# Patient Record
Sex: MALE | Race: WHITE | NOT HISPANIC OR LATINO | URBAN - METROPOLITAN AREA
[De-identification: names, ages, dates, MRNs, and addresses within clinical notes are randomized per-mention and may not be internally consistent; named-entity substitution may affect disease eponyms.]

---

## 2022-01-01 ENCOUNTER — LAB OUTSIDE AN ENCOUNTER (OUTPATIENT)
Dept: URBAN - METROPOLITAN AREA CLINIC 113 | Facility: CLINIC | Age: 47
End: 2022-01-01

## 2022-01-01 ENCOUNTER — CLAIMS CREATED FROM THE CLAIM WINDOW (OUTPATIENT)
Dept: URBAN - METROPOLITAN AREA MEDICAL CENTER 19 | Facility: MEDICAL CENTER | Age: 47
End: 2022-01-01
Payer: COMMERCIAL

## 2022-01-01 ENCOUNTER — WEB ENCOUNTER (OUTPATIENT)
Dept: URBAN - METROPOLITAN AREA CLINIC 113 | Facility: CLINIC | Age: 47
End: 2022-01-01

## 2022-01-01 ENCOUNTER — OFFICE VISIT (OUTPATIENT)
Dept: URBAN - METROPOLITAN AREA MEDICAL CENTER 2 | Facility: MEDICAL CENTER | Age: 47
End: 2022-01-01
Payer: COMMERCIAL

## 2022-01-01 ENCOUNTER — TELEPHONE ENCOUNTER (OUTPATIENT)
Dept: URBAN - METROPOLITAN AREA CLINIC 113 | Facility: CLINIC | Age: 47
End: 2022-01-01

## 2022-01-01 ENCOUNTER — DASHBOARD ENCOUNTERS (OUTPATIENT)
Age: 47
End: 2022-01-01

## 2022-01-01 ENCOUNTER — OFFICE VISIT (OUTPATIENT)
Dept: URBAN - METROPOLITAN AREA CLINIC 113 | Facility: CLINIC | Age: 47
End: 2022-01-01
Payer: COMMERCIAL

## 2022-01-01 ENCOUNTER — CLAIMS CREATED FROM THE CLAIM WINDOW (OUTPATIENT)
Dept: URBAN - METROPOLITAN AREA MEDICAL CENTER 43 | Facility: MEDICAL CENTER | Age: 47
End: 2022-01-01
Payer: COMMERCIAL

## 2022-01-01 VITALS
HEIGHT: 72 IN | BODY MASS INDEX: 23.98 KG/M2 | DIASTOLIC BLOOD PRESSURE: 79 MMHG | TEMPERATURE: 97.8 F | SYSTOLIC BLOOD PRESSURE: 128 MMHG | HEART RATE: 80 BPM | WEIGHT: 177 LBS

## 2022-01-01 DIAGNOSIS — R10.816 EPIGASTRIC TENDERNESS: ICD-10-CM

## 2022-01-01 DIAGNOSIS — R10.9 UNSPECIFIED ABDOMINAL PAIN: ICD-10-CM

## 2022-01-01 DIAGNOSIS — C25.9 MALIGNANT NEOPLASM OF PANCREAS, UNSPECIFIED: ICD-10-CM

## 2022-01-01 DIAGNOSIS — K83.1 AMPULLA OF VATER OBSTRUCTION SYNDROME: ICD-10-CM

## 2022-01-01 DIAGNOSIS — R74.8 ABNORMAL LEVELS OF OTHER SERUM ENZYMES: ICD-10-CM

## 2022-01-01 DIAGNOSIS — R93.5 ABNORMAL CT OF THE ABDOMEN: ICD-10-CM

## 2022-01-01 DIAGNOSIS — K31.89 ACQUIRED DEFORMITY OF PYLORUS: ICD-10-CM

## 2022-01-01 DIAGNOSIS — K83.1 OBSTRUCTION OF BILE DUCT: ICD-10-CM

## 2022-01-01 DIAGNOSIS — R14.0 ABDOMINAL BLOATING , IMPROVED BUT NOT RESOLVED WITH XIFAXAN: ICD-10-CM

## 2022-01-01 DIAGNOSIS — D49.0 COLON NEOPLASM: ICD-10-CM

## 2022-01-01 DIAGNOSIS — C25.0 CA HEAD OF PANCREAS: ICD-10-CM

## 2022-01-01 DIAGNOSIS — K85.00 IDIOPATHIC ACUTE PANCREATITIS WITHOUT NECROSIS OR INFECTION: ICD-10-CM

## 2022-01-01 DIAGNOSIS — R11.2 ACUTE NAUSEA WITH NONBILIOUS VOMITING: ICD-10-CM

## 2022-01-01 DIAGNOSIS — C25.9 ADENOCARCINOMA OF PANCREAS: ICD-10-CM

## 2022-01-01 DIAGNOSIS — R93.2 ABN FIND-BILIARY TRACT: ICD-10-CM

## 2022-01-01 DIAGNOSIS — K86.89 MASS OF PANCREAS: ICD-10-CM

## 2022-01-01 DIAGNOSIS — E46 PROTEIN CALORIE MALNUTRITION: ICD-10-CM

## 2022-01-01 DIAGNOSIS — K83.1 BILIARY STRICTURE: ICD-10-CM

## 2022-01-01 DIAGNOSIS — C78.7 CANCER, METASTATIC TO LIVER: ICD-10-CM

## 2022-01-01 DIAGNOSIS — K85.80 AUTOIMMUNE PANCREATITIS: ICD-10-CM

## 2022-01-01 DIAGNOSIS — K83.8 OTHER SPECIFIED DISEASES OF BILIARY TRACT: ICD-10-CM

## 2022-01-01 DIAGNOSIS — B37.81 CANDIDA: ICD-10-CM

## 2022-01-01 DIAGNOSIS — K83.1 BILE OBSTRUCTION: ICD-10-CM

## 2022-01-01 DIAGNOSIS — R93.3 ABNORMAL ABDOMINAL ULTRASOUND SUGGESTING HEMANGIOMA: ICD-10-CM

## 2022-01-01 DIAGNOSIS — R10.817 ABDOMINAL TENDERNESS, GENERALIZED: ICD-10-CM

## 2022-01-01 DIAGNOSIS — R74.8 ELEVATED LIVER ENZYMES: ICD-10-CM

## 2022-01-01 DIAGNOSIS — R74.8 ABNORMAL AST AND ALT: ICD-10-CM

## 2022-01-01 DIAGNOSIS — C25.0 ADENOCARCINOMA OF HEAD OF PANCREAS: ICD-10-CM

## 2022-01-01 DIAGNOSIS — K22.70 BARRETT ESOPHAGUS: ICD-10-CM

## 2022-01-01 DIAGNOSIS — R14.0 ABDOMINAL BLOATING: ICD-10-CM

## 2022-01-01 DIAGNOSIS — R74.01 ALT (SGPT) LEVEL RAISED: ICD-10-CM

## 2022-01-01 DIAGNOSIS — K86.2 PANCREAS CYST: ICD-10-CM

## 2022-01-01 DIAGNOSIS — K86.1 CHRONIC PANCREATITIS: ICD-10-CM

## 2022-01-01 DIAGNOSIS — R63.4 ABNORMAL INTENTIONAL WEIGHT LOSS: ICD-10-CM

## 2022-01-01 DIAGNOSIS — R17 JAUNDICE: ICD-10-CM

## 2022-01-01 DIAGNOSIS — Z79.01 ANTICOAGULANT LONG-TERM USE: ICD-10-CM

## 2022-01-01 LAB — CA 19-9: 841

## 2022-01-01 PROCEDURE — 99232 SBSQ HOSP IP/OBS MODERATE 35: CPT | Performed by: INTERNAL MEDICINE

## 2022-01-01 PROCEDURE — 99254 IP/OBS CNSLTJ NEW/EST MOD 60: CPT | Performed by: INTERNAL MEDICINE

## 2022-01-01 PROCEDURE — 99233 SBSQ HOSP IP/OBS HIGH 50: CPT | Performed by: INTERNAL MEDICINE

## 2022-01-01 PROCEDURE — 43260 ERCP W/SPECIMEN COLLECTION: CPT | Performed by: INTERNAL MEDICINE

## 2022-01-01 PROCEDURE — 99214 OFFICE O/P EST MOD 30 MIN: CPT | Performed by: NURSE PRACTITIONER

## 2022-01-01 PROCEDURE — 99254 IP/OBS CNSLTJ NEW/EST MOD 60: CPT | Performed by: PHYSICIAN ASSISTANT

## 2022-01-01 PROCEDURE — 99222 1ST HOSP IP/OBS MODERATE 55: CPT | Performed by: INTERNAL MEDICINE

## 2022-01-01 PROCEDURE — 43239 EGD BIOPSY SINGLE/MULTIPLE: CPT | Performed by: INTERNAL MEDICINE

## 2022-01-01 PROCEDURE — 43276 ERCP STENT EXCHANGE W/DILATE: CPT | Performed by: INTERNAL MEDICINE

## 2022-01-01 PROCEDURE — 43262 ENDO CHOLANGIOPANCREATOGRAPH: CPT | Performed by: INTERNAL MEDICINE

## 2022-01-01 PROCEDURE — 43242 EGD US FINE NEEDLE BX/ASPIR: CPT | Performed by: INTERNAL MEDICINE

## 2022-01-01 PROCEDURE — 43274 ERCP DUCT STENT PLACEMENT: CPT | Performed by: INTERNAL MEDICINE

## 2022-01-01 PROCEDURE — 74328 X-RAY BILE DUCT ENDOSCOPY: CPT | Performed by: INTERNAL MEDICINE

## 2022-01-01 RX ORDER — AMOXICILLIN 250 MG
1 TABLET IN THE EVENING AS NEEDED CAPSULE ORAL ONCE A DAY
Status: ACTIVE | COMMUNITY

## 2022-01-01 RX ORDER — PANTOPRAZOLE SODIUM 40 MG/1
1 TABLET TABLET, DELAYED RELEASE ORAL ONCE A DAY
Status: ACTIVE | COMMUNITY

## 2022-01-01 RX ORDER — DICYCLOMINE HYDROCHLORIDE 20 MG/1
1 TABLET TABLET ORAL THREE TIMES A DAY
Status: ACTIVE | COMMUNITY

## 2022-01-01 RX ORDER — FLUCONAZOLE 100 MG/1
1 TABLET TABLET ORAL ONCE DAILY
Qty: 7 | Refills: 0 | OUTPATIENT
Start: 2022-01-01 | End: 2022-01-01

## 2022-01-01 RX ORDER — HYDROMORPHONE HYDROCHLORIDE 4 MG/1
1 TABLET AS NEEDED TABLET ORAL
Status: ACTIVE | COMMUNITY

## 2022-01-01 RX ORDER — DIPHENHYDRAMINE HYDROCHLORIDE AND ZINC ACETATE .01; .001 MG/G; U/G
1 CAPSULE WITH FOOD OR MILK AS NEEDED CREAM TOPICAL THREE TIMES A DAY
Status: ACTIVE | COMMUNITY

## 2022-03-17 PROBLEM — 31258000: Status: ACTIVE | Noted: 2022-01-01

## 2022-03-17 NOTE — HPI-TODAY'S VISIT:
This is a 46-year-old male with a history of pancreatitis, elevated LFTs/jaundice, common bile duct stricture, pancreatic mass, and elevated CA 19-9 presenting for hospital follow-up. He was initially seen in hospital consultation 2/14/2022 for acute pancreatitis.  Etiology was unclear.  Liver enzymes were elevated.  MRCP was negative for choledocholithiasis.  Also noted was a 1.2 cm cystic lesion in uncinate process of the pancreas.  Labs to assess for autoimmune hepatitis were pending at discharge.  He was seen again in hospital consultation 3/3/2022 for acute pancreatitis and concern for pancreatic duct outlet obstruction.  IgG4, ASMA, AMA, and alpha-1 antitrypsin at prior admission were negative.  Imaging demonstrated dilated gallbladder and intrahepatic bile ducts without choledocholithiasis.  CT redemonstrated cystic lesion of the uncinate process of the pancreas and some pancreatic head stranding.  CA 19-9 was significantly elevated.  He underwent an ERCP on 3/4/2022 the ampulla appeared a bit edematous and small.  The pancreatic duct was cannulated.  The guidewire could not be passed into the common bile duct.  He had a subsequent ERCP 3/7/2022 demonstrating a single severe biliary stricture in the lower third of the main bile duct that was malignant in appearance.  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  The upper third of the main bile duct and middle third of the main bile duct were dilated to 10 mm secondary to a stricture.  Sphincterotomy was performed.  Cells were obtained from the lower third of the main duct.  A plastic 10 [place] by 7 cm stent was placed and has a common bile duct.  Repeat ERCP recommended in 6 weeks to remove the stent if he was not a surgical candidate.  Pathology was negative for malignant cells. He is under the care of Dr. Brown who is considering a Whipple.  He continues to have pain indicated in the epigastrium and right upper quadrant in the mornings.  He reports improvement of symptoms since placement of stent.  He has been able to sleep for the last 3 nights.  He is requiring pain medication.  He had one episode of nausea since discharge.  He has been gaining weight.  He is having normal bowel movements and denies any other abdominal symptoms. Per prior notes, he has a paternal history of colon cancer and had a normal colonoscopy 10 years prior.

## 2022-03-18 PROBLEM — 707724006: Status: ACTIVE | Noted: 2022-01-01

## 2022-03-18 PROBLEM — 235921003: Status: ACTIVE | Noted: 2022-01-01

## 2022-03-18 PROBLEM — 18165001: Status: ACTIVE | Noted: 2022-01-01

## 2022-03-18 PROBLEM — 15634181000119107: Status: ACTIVE | Noted: 2022-01-01

## 2022-03-18 PROBLEM — 425810000: Status: ACTIVE | Noted: 2022-01-01

## 2022-03-21 PROBLEM — 20639004 CANDIDIASIS OF THE ESOPHAGUS: Status: ACTIVE | Noted: 2022-01-01

## 2022-03-21 PROBLEM — 235595009 GASTROESOPHAGEAL REFLUX DISEASE: Status: ACTIVE | Noted: 2022-01-01

## 2022-03-21 PROBLEM — 235494005 CHRONIC PANCREATITIS: Status: ACTIVE | Noted: 2022-01-01

## 2022-03-21 PROBLEM — 302914006 BARRETTS ESOPHAGUS: Status: ACTIVE | Noted: 2022-01-01

## 2022-03-24 PROBLEM — 363418001 PANCREATIC CANCER: Status: ACTIVE | Noted: 2022-01-01

## 2022-07-08 PROBLEM — 238107002 PROTEIN CALORIE MALNUTRITION: Status: ACTIVE | Noted: 2022-01-01

## 2022-08-06 PROBLEM — 94381002 SECONDARY MALIGNANT NEOPLASM OF LIVER: Status: ACTIVE | Noted: 2022-08-06
